# Patient Record
Sex: FEMALE | Race: BLACK OR AFRICAN AMERICAN | Employment: FULL TIME | URBAN - METROPOLITAN AREA
[De-identification: names, ages, dates, MRNs, and addresses within clinical notes are randomized per-mention and may not be internally consistent; named-entity substitution may affect disease eponyms.]

---

## 2018-04-19 ENCOUNTER — OFFICE VISIT (OUTPATIENT)
Dept: CARDIOLOGY CLINIC | Age: 48
End: 2018-04-19

## 2018-04-19 ENCOUNTER — TELEPHONE (OUTPATIENT)
Dept: CARDIOLOGY CLINIC | Age: 48
End: 2018-04-19

## 2018-04-19 VITALS
SYSTOLIC BLOOD PRESSURE: 110 MMHG | DIASTOLIC BLOOD PRESSURE: 80 MMHG | OXYGEN SATURATION: 99 % | HEIGHT: 63 IN | WEIGHT: 195 LBS | HEART RATE: 70 BPM | BODY MASS INDEX: 34.55 KG/M2 | RESPIRATION RATE: 16 BRPM

## 2018-04-19 DIAGNOSIS — R10.9 ABDOMINAL PAIN, UNSPECIFIED ABDOMINAL LOCATION: ICD-10-CM

## 2018-04-19 DIAGNOSIS — R00.2 HEART PALPITATIONS: Primary | ICD-10-CM

## 2018-04-19 DIAGNOSIS — Z95.828 S/P IVC FILTER: ICD-10-CM

## 2018-04-19 DIAGNOSIS — M54.9 BACK PAIN, UNSPECIFIED BACK LOCATION, UNSPECIFIED BACK PAIN LATERALITY, UNSPECIFIED CHRONICITY: ICD-10-CM

## 2018-04-19 DIAGNOSIS — Z86.711 HISTORY OF PULMONARY EMBOLISM: ICD-10-CM

## 2018-04-19 DIAGNOSIS — I82.409 DEEP VEIN THROMBOSIS (DVT) OF LOWER EXTREMITY, UNSPECIFIED CHRONICITY, UNSPECIFIED LATERALITY, UNSPECIFIED VEIN (HCC): ICD-10-CM

## 2018-04-19 RX ORDER — LANOLIN ALCOHOL/MO/W.PET/CERES
400 CREAM (GRAM) TOPICAL 2 TIMES DAILY
COMMUNITY
End: 2018-04-19 | Stop reason: SDUPTHER

## 2018-04-19 NOTE — TELEPHONE ENCOUNTER
Verified patient with two patient identifiers. Spoke with patient,she will have her lab work done when she comes for Echo and holter on April 30,2018.

## 2018-04-19 NOTE — MR AVS SNAPSHOT
[address] [facility] [address] [place] [address] 
[phone] Patient: Mckayla Ardon MRN: [id number] :1970 Visit Information Date & Time Provider Department Dept. Phone Encounter #  
 2018  2:40 PM Raine Calvin MD [facility] [phone] [id number] Upcoming Health Maintenance Date Due DTaP/Tdap/Td series (1 - Tdap) 10/10/1992 PAP AKA CERVICAL CYTOLOGY 10/10/1992 Influenza Age 5 to Adult 2017 Allergies as of 2018  Review Complete On: 2018 By: Wojciech Rodriguez Severity Noted Reaction Type Reactions Latex  2012    Rash Advil [Ibuprofen]  04/15/2016    Other (comments)  
 heartburn Ciprofloxacin  2011    Nausea and Vomiting Demerol [Meperidine]  2011    Anaphylaxis Tylenol-codeine #2  10/29/2015    Palpitations Current Immunizations  Never Reviewed No immunizations on file. Not reviewed this visit You Were Diagnosed With   
  
 Codes Comments Heart palpitations    -  Primary ICD-10-CM: R00.2 ICD-9-CM: 785.1 Vitals BP Pulse Resp Height(growth percentile) Weight(growth percentile) LMP  
 110/80 (BP 1 Location: Left arm, BP Patient Position: Sitting) 70 16 5' 3\" (1.6 m) 195 lb (88.5 kg) 2015 SpO2 BMI OB Status Smoking Status 99% 34.54 kg/m2 Hysterectomy Never Smoker BMI and BSA Data Body Mass Index Body Surface Area 34.54 kg/m 2 1.98 m 2 Preferred Pharmacy Pharmacy Name Phone [phone] [facility] [phone] Your Updated Medication List  
  
   
This list is accurate as of 18  3:26 PM.  Always use your most recent med list.  
  
  
  
  
 magnesium oxide 400 mg tablet Commonly known as:  MAG-OX Take 400 mg by mouth two (2) times a day. PROBIOTIC (S.BOULARDII) PO Take  by mouth. We Performed the Following AMB POC EKG ROUTINE W/ 12 LEADS, INTER & REP [43413 CPT(R)] Patient Instructions Refer to [employer] (Will call with date and time) Start magnesium 400 mg twice a day Echo,holter and will call results Introducing [facility]! Louis Ashraf introduces [employer] patient portal. Now you can access parts of your medical record, email your doctor's office, and request medication refills online. 1. In your internet browser, go to [email or web address] 2. Click on the First Time User? Click Here link in the Sign In box. You will see the New Member Sign Up page. 3. Enter your [employer] Access Code exactly as it appears below. You will not need to use this code after youve completed the sign-up process. If you do not sign up before the expiration date, you must request a new code. · [employer] Access Code: [email or web address] Expires: 7/8/2018  5:37 PM 
 
4. Enter the last four digits of your Social Security Number (xxxx) and Date of Birth (mm/dd/yyyy) as indicated and click Submit. You will be taken to the next sign-up page. 5. Create a [employer] ID. This will be your [employer] login ID and cannot be changed, so think of one that is secure and easy to remember. 6. Create a [employer] password. You can change your password at any time. 7. Enter your Password Reset Question and Answer. This can be used at a later time if you forget your password. 8. Enter your e-mail address. You will receive e-mail notification when new information is available in [address] 9. Click Sign Up. You can now view and download portions of your medical record. 10. Click the Download Summary menu link to download a portable copy of your medical information. If you have questions, please visit the Frequently Asked Questions section of the [employer] website.  Remember, [employer] is NOT to be used for urgent needs. For medical emergencies, dial 911. Now available from your iPhone and Android! Please provide this summary of care documentation to your next provider. Your primary care clinician is listed as Dar Morris. If you have any questions after today's visit, please call [phone].

## 2018-04-19 NOTE — PATIENT INSTRUCTIONS
Refer to [facility] (Will call with date and time)    Start magnesium 400 mg twice a day    Echo,holter and will call results

## 2018-04-19 NOTE — TELEPHONE ENCOUNTER
I called and left a messaged for her to call back. Per  do BMP and magnesium level. She can get this lab drawn when she comes back for Echo and holter.

## 2018-04-19 NOTE — PROGRESS NOTES
Amy Hallman     1970       Zainab Thornton MD, [facility]  Date of Visit-2018   PCP is Mohan Hernandez MD   [facility] [place]  [facility]  HPI:  Amy Hallman is a 56 y.o. female   referral from Pt. First for palpitations. I had last seen 2013 for elevated BP and heart palpitations        The pt states that lately she has been experiencing some fluttering in her chest. This makes her feel short of breath. She states that this fluttering occurs suddenly and lasts for a few seconds. The pt describes this as feeling like her heart has dropped. She states that she is feeling this fluttering sensation 3 times a day. The pt states that she hasn't been exercising that much recently. She denies any lifestyle changes or excessive amounts of caffeine that could be attributing to these palpitations. The pt denies smoking or being stressed at this time. She notes that she had blood work done at [facility]. The pt states that she was feeling a sharp pain on her right side that radiates to her abdomen. This worsens with food and feels like the pain she had prior to having her gallbladder removed. The pt still has jono filters in from her  in 80. She has never discussed with anyone removed her filters. Famhx: father with bypass at 61    Denies chest pain, edema, syncope or shortness of breath at rest, has no tachycardia or sense of arrhythmia. EKG: NSR WNL normal axis and intervals     Assessment/Plan:     1. Palpitations that sound like ectopics with a sudden feeling of a dropped beat. Will get a 24 hour holter since they occur fairly frequently    2. Multiple previous IVC filters, she is concerned about clotting and other late complications, we will have her see Dr. Brooke Johnson. 3. Get echo given hx of extensive DVT to look at right side of the heart and RV size given palps    4.  Colicky back and abdominal pain feels like her previous pain but has had a cholecystectomy, she will talk to GI about next steps *.    5. Get labs, electrolytes from pt first and start on magnesium oxide 400 mg   Future Appointments  Date Time Provider Fortunato Patel   4/30/2018 8:00 AM ECHOTWO, [address]   4/30/2018 9:00 AM HOLTER, [address]      Patient Instructions   Refer to [employer] (Will call with date and time)    Start magnesium 400 mg twice a day    Echo,holter and will call results     Key CAD CHF Meds     Patient is on no cardiovascular meds. Impression:   1. Heart palpitations    2. S/P IVC filter    3. Deep vein thrombosis (DVT) of lower extremity, unspecified chronicity, unspecified laterality, unspecified vein (HCC)    4. Abdominal pain, unspecified abdominal location    5. Back pain, unspecified back location, unspecified back pain laterality, unspecified chronicity    6. History of pulmonary embolism       Cardiac History:   NUKE 10-9-13= 9 minutes within normal limits ef 68%    Echocardiogram done May 30, 2013 showed EF of 55-60%, left atrium mildly dilated with mild MR and mild TR. A Holter showed sinus rhythm with sinus tachycardia, two PACs and no diary. There were overall 88,638 beats with no PVCs and two isolated PACs at 10:00 PM noted. Social history: Nonsmoker. Drinks 1-2 cups a year. Works as an  and drinks 1-2 cups of caffeine yearly. . Family history: Mother with hypertension, CAD in her 62s. Review of Systems   Constitutional: Negative for diaphoresis, fever and malaise/fatigue. HENT: Positive for tinnitus. Negative for ear pain, hearing loss and nosebleeds. Eyes: Positive for blurred vision. Negative for double vision and pain. Respiratory: Negative for cough, hemoptysis, sputum production, shortness of breath, wheezing and stridor. Cardiovascular: Positive for palpitations.  Negative for chest pain, orthopnea, claudication and leg swelling. Gastrointestinal: Positive for abdominal pain and diarrhea. Negative for blood in stool, constipation, heartburn, melena, nausea and vomiting. Genitourinary: Negative for dysuria, frequency and urgency. Musculoskeletal: Positive for back pain and joint pain. Negative for falls, myalgias and neck pain. Skin: Positive for itching. Negative for rash. Neurological: Positive for dizziness, sensory change and headaches. Negative for seizures, loss of consciousness and weakness. Endo/Heme/Allergies: Does not bruise/bleed easily. Psychiatric/Behavioral: Negative for depression, hallucinations and memory loss. The patient has insomnia. The patient is not nervous/anxious. see supplement sheet, initialed and to be scanned by staff  Past Medical History:   Diagnosis Date    Chest pain     normal GAURI 2012 9 minutes    Cholecystitis     gallstones, choly June 2011 [id number]    DVT, lower extremity [facility])     post pregnancy June 1999    Elevated BP     GERD (gastroesophageal reflux disease)     Heart palpitations     Pulmonary embolism (HCC)     several Stefania's       Social Hx= reports that she has never smoked. She has never used smokeless tobacco. She reports that she does not drink alcohol or use illicit drugs. Exam and Labs:  /80 (BP 1 Location: Left arm, BP Patient Position: Sitting)  Pulse 70  Resp 16  Ht 5' 3\" (1.6 m)  Wt 195 lb (88.5 kg)  LMP 01/22/2015  SpO2 99%  BMI 34.54 kg/w4Vpmvpgintiolpb:  NAD, comfortable  Head: NC,AT. Eyes: No scleral icterus. Neck:  Neck supple. No JVD present. Throat: moist mucous membranes. Chest: Effort normal & normal respiratory excursion . Neurological: alert, conversant and oriented . Skin: Skin is not cold. No obvious systemic rash noted. Not diaphoretic. No erythema. Psychiatric:  Grossly normal mood and affect. Behavior appears normal. Extremities:  no clubbing or cyanosis.  Abdomen: non distended    Lungs:breath sounds normal. No stridor. distress, wheezes or  Rales. Heart: normal rate, regular rhythm, normal S1, S2, no murmurs, rubs, clicks or gallops , PMI non displaced. Edema: Edema is . Wt Readings from Last 3 Encounters:   04/19/18 195 lb (88.5 kg)   12/12/16 194 lb 7.1 oz (88.2 kg)   12/11/16 194 lb 10.7 oz (88.3 kg)      BP Readings from Last 3 Encounters:   04/19/18 110/80   12/12/16 (!) 113/98   12/11/16 131/72      Current Outpatient Prescriptions   Medication Sig    SACCHAROMYCES BOULARDII (PROBIOTIC, S.BOULARDII, PO) Take  by mouth. No current facility-administered medications for this visit. Impression see above.       Written by Monisha Lei, as dictated by Belia Obrien MD.

## 2018-04-20 ENCOUNTER — TELEPHONE (OUTPATIENT)
Dept: CARDIOLOGY CLINIC | Age: 48
End: 2018-04-20

## 2018-04-20 RX ORDER — LANOLIN ALCOHOL/MO/W.PET/CERES
400 CREAM (GRAM) TOPICAL 2 TIMES DAILY
Qty: 60 TAB | Refills: 3 | Status: SHIPPED | OUTPATIENT
Start: 2018-04-20

## 2018-04-20 NOTE — TELEPHONE ENCOUNTER
Verified patient with two patient identifiers.   Spoke with Kenyon Peters from [facility] and gave me an appointment on May 11,2018 at [facility] office @ 10 AM.

## 2018-04-30 ENCOUNTER — CLINICAL SUPPORT (OUTPATIENT)
Dept: CARDIOLOGY CLINIC | Age: 48
End: 2018-04-30

## 2018-04-30 DIAGNOSIS — R00.2 PALPITATIONS: Primary | ICD-10-CM

## 2018-04-30 DIAGNOSIS — R06.02 SOB (SHORTNESS OF BREATH): ICD-10-CM

## 2018-04-30 DIAGNOSIS — I51.7 LAE (LEFT ATRIAL ENLARGEMENT): ICD-10-CM

## 2018-04-30 PROBLEM — Z86.711 HISTORY OF PULMONARY EMBOLISM: Status: ACTIVE | Noted: 2018-04-30

## 2018-05-01 LAB
BUN SERPL-MCNC: 14 MG/DL (ref 6–24)
BUN/CREAT SERPL: 22 (ref 9–23)
CALCIUM SERPL-MCNC: 9.1 MG/DL (ref 8.7–10.2)
CHLORIDE SERPL-SCNC: 100 MMOL/L (ref 96–106)
CO2 SERPL-SCNC: 25 MMOL/L (ref 18–29)
CREAT SERPL-MCNC: 0.64 MG/DL (ref 0.57–1)
GFR SERPLBLD CREATININE-BSD FMLA CKD-EPI: 107 ML/MIN/1.73
GFR SERPLBLD CREATININE-BSD FMLA CKD-EPI: 124 ML/MIN/1.73
GLUCOSE SERPL-MCNC: 94 MG/DL (ref 65–99)
MAGNESIUM SERPL-MCNC: 2.2 MG/DL (ref 1.6–2.3)
POTASSIUM SERPL-SCNC: 4.6 MMOL/L (ref 3.5–5.2)
SODIUM SERPL-SCNC: 139 MMOL/L (ref 134–144)

## 2018-05-09 ENCOUNTER — DOCUMENTATION ONLY (OUTPATIENT)
Dept: CARDIOLOGY CLINIC | Age: 48
End: 2018-05-09

## 2018-05-09 NOTE — LETTER
5/10/2018 6:45 PM 
 
Ms. Amy Hallman [address] [place] [address]. [id number] Dear Ms Pérez [place] Your echocardiogram (ultrasound) test of your heart is unremarkable. Your heart function is normal and your heart valves have no significant abnormalities. Your Holter monitor showed no significant arrhythmia with just 2 early beats in the full time of the monitor which is normal 
 
Fortunately your symptoms do not seem to be cardiac. If they get worse or if during the time your wore the monitor you did not feel your typical episodes , we could do a 30 day loop monitor and please let us know.  
 
Sincerely, 
 
 
Elen Singh MD

## 2018-05-10 NOTE — PROGRESS NOTES
Let her know echo and monitor are normal  No significant arrhythmia ,very rare PVC which is normal  Heart fx fine with no stretch of chambers or valve problem  No further testing needed

## 2018-05-16 ENCOUNTER — TELEPHONE (OUTPATIENT)
Dept: CARDIOLOGY CLINIC | Age: 48
End: 2018-05-16

## 2018-05-18 NOTE — TELEPHONE ENCOUNTER
Verified patient with two types of identifiers. Notified patient of echo and holter results. Patient states that she did receive the letter in the mail yesterday with the results. Patient states that she will call back if she feels she needs to proceed with the 30 day loop monitor. Patient verbalized understanding and will call with any other questions.

## 2018-07-02 ENCOUNTER — TELEPHONE (OUTPATIENT)
Dept: CARDIOLOGY CLINIC | Age: 48
End: 2018-07-02

## 2018-07-02 NOTE — TELEPHONE ENCOUNTER
Verified patient with two types of identifiers. Pt is to have some procedures scheduled. Needs clearance. Office contact provided by patient. Will call for more information. [place] teeth extraction. Dr. Messina [facility] office. Contacted office. Patient is scheduled 7/12/18 for consult. [facility] contact information provided. Will send request for clearance after consult. Called Dr. Kristi Mayorga office with [facility]. Patient is to have possible hysteroscopy, DNC, with possible LEEP with anesthesia and needs cardiac clearance.   Fax: [phone]    Will ask MD

## 2018-07-03 NOTE — TELEPHONE ENCOUNTER
Low risk procedures  I have no objection to the planned  surgery. Patient seems to be at a low risk for lucila-operative severe adverse cardiac events.

## 2018-07-03 NOTE — TELEPHONE ENCOUNTER
Verified patient with two types of identifiers. Informed pt of MD recommendation below. Verbalizes understanding. And will call with any questions or concerns.

## 2018-07-03 NOTE — TELEPHONE ENCOUNTER
Faxed Cardiac risk assessment to Dr. Erasmo Mireles office at fax number [phone]. Fax confirmation received.

## 2018-07-13 ENCOUNTER — TELEPHONE (OUTPATIENT)
Dept: CARDIOLOGY CLINIC | Age: 48
End: 2018-07-13

## 2018-07-13 RX ORDER — ROSUVASTATIN CALCIUM 5 MG/1
TABLET, COATED ORAL
COMMUNITY

## 2018-07-13 NOTE — TELEPHONE ENCOUNTER
Verified patient with two types of identifiers. Patient states that her PCP started her on Crestor 5 mg daily based on labs a year ago. She states her PCP ordered new labs for her but wanted to notify MD. Will update her medication list. Patient verbalized understanding and will call with any other questions.

## 2018-07-13 NOTE — TELEPHONE ENCOUNTER
Pt called to inform Dr. Morales Johnson that she was recently prescribed a cholesterol medication by her PCP. Medication name: Crestor 5mg tab  Pt can be reached at #[phone].   Thanks

## 2018-08-31 ENCOUNTER — TELEPHONE (OUTPATIENT)
Dept: CARDIOLOGY CLINIC | Age: 48
End: 2018-08-31

## 2018-08-31 NOTE — TELEPHONE ENCOUNTER
Verified patient with two types of identifiers. Notified patient that we have not received EKG yet but that we will be looking out for it. Gave patient fax number. Patient verbalized understanding and will call with any other questions.

## 2018-08-31 NOTE — TELEPHONE ENCOUNTER
Pt is calling in regards to an EKG results that Dr. [address] is suppose to review before patient surgery on Wednesday September 5, 2018. She had EKG done at [facility] for pre-admission for the surgery. [facility] stated they would send EKG over for review.    Phone# [phone]

## 2022-03-18 PROBLEM — Z86.711 HISTORY OF PULMONARY EMBOLISM: Status: ACTIVE | Noted: 2018-04-30

## 2024-02-10 ENCOUNTER — HOSPITAL ENCOUNTER (OUTPATIENT)
Facility: HOSPITAL | Age: 54
Discharge: HOME | End: 2024-02-13
Attending: ORTHOPAEDIC SURGERY

## 2024-02-10 DIAGNOSIS — M25.551 RIGHT HIP PAIN: ICD-10-CM

## 2024-03-21 ENCOUNTER — HOSPITAL ENCOUNTER (OUTPATIENT)
Facility: HOSPITAL | Age: 54
Discharge: HOME | End: 2024-03-21
Attending: ORTHOPAEDIC SURGERY
Payer: OTHER GOVERNMENT

## 2024-03-21 DIAGNOSIS — M25.551 RIGHT HIP PAIN: ICD-10-CM

## 2024-03-21 PROCEDURE — 73700 CT LOWER EXTREMITY W/O DYE: CPT

## 2024-05-09 ENCOUNTER — OFFICE VISIT (OUTPATIENT)
Age: 54
End: 2024-05-09
Payer: OTHER GOVERNMENT

## 2024-05-09 VITALS
SYSTOLIC BLOOD PRESSURE: 128 MMHG | HEART RATE: 70 BPM | DIASTOLIC BLOOD PRESSURE: 80 MMHG | WEIGHT: 189 LBS | OXYGEN SATURATION: 99 % | HEIGHT: 63 IN | BODY MASS INDEX: 33.49 KG/M2

## 2024-05-09 DIAGNOSIS — R07.2 PRECORDIAL CHEST PAIN: Primary | ICD-10-CM

## 2024-05-09 DIAGNOSIS — I10 HYPERTENSION, ESSENTIAL: ICD-10-CM

## 2024-05-09 DIAGNOSIS — E78.00 PURE HYPERCHOLESTEROLEMIA: ICD-10-CM

## 2024-05-09 DIAGNOSIS — E11.9 TYPE 2 DIABETES MELLITUS WITHOUT COMPLICATION, UNSPECIFIED WHETHER LONG TERM INSULIN USE (HCC): ICD-10-CM

## 2024-05-09 DIAGNOSIS — Z09 HOSPITAL DISCHARGE FOLLOW-UP: ICD-10-CM

## 2024-05-09 PROCEDURE — 3074F SYST BP LT 130 MM HG: CPT | Performed by: SPECIALIST

## 2024-05-09 PROCEDURE — 1111F DSCHRG MED/CURRENT MED MERGE: CPT | Performed by: SPECIALIST

## 2024-05-09 PROCEDURE — 99244 OFF/OP CNSLTJ NEW/EST MOD 40: CPT | Performed by: SPECIALIST

## 2024-05-09 PROCEDURE — 3079F DIAST BP 80-89 MM HG: CPT | Performed by: SPECIALIST

## 2024-05-09 ASSESSMENT — PATIENT HEALTH QUESTIONNAIRE - PHQ9
SUM OF ALL RESPONSES TO PHQ QUESTIONS 1-9: 0
1. LITTLE INTEREST OR PLEASURE IN DOING THINGS: NOT AT ALL
SUM OF ALL RESPONSES TO PHQ QUESTIONS 1-9: 0

## 2024-05-09 NOTE — PATIENT INSTRUCTIONS
Keep a home blood pressure diary. Check your blood pressure three times a week at different times. Be sure to make sure you are sitting still for at least five minutes with both feet flat on the floor and arm heart level. Send a [employer] message when you have 10-15 numbers. We will schedule you for an in office BP check.    You have been scheduled for a stress echocardiogram. Please arrive 15 minutes prior to your appointment. Wear comfortable clothes and shoes. Please do not eat or drink anything other than water two hours prior to test. We will send results on [employer].

## 2024-05-16 ENCOUNTER — OFFICE VISIT (OUTPATIENT)
Age: 54
End: 2024-05-16

## 2024-05-16 VITALS
DIASTOLIC BLOOD PRESSURE: 80 MMHG | HEART RATE: 64 BPM | RESPIRATION RATE: 16 BRPM | HEIGHT: 64 IN | OXYGEN SATURATION: 98 % | BODY MASS INDEX: 32.27 KG/M2 | WEIGHT: 189 LBS | SYSTOLIC BLOOD PRESSURE: 130 MMHG

## 2024-05-16 DIAGNOSIS — I10 HYPERTENSION, ESSENTIAL: Primary | ICD-10-CM

## 2024-05-16 ASSESSMENT — PATIENT HEALTH QUESTIONNAIRE - PHQ9
SUM OF ALL RESPONSES TO PHQ9 QUESTIONS 1 & 2: 0
SUM OF ALL RESPONSES TO PHQ QUESTIONS 1-9: 0
1. LITTLE INTEREST OR PLEASURE IN DOING THINGS: NOT AT ALL
SUM OF ALL RESPONSES TO PHQ QUESTIONS 1-9: 0
2. FEELING DOWN, DEPRESSED OR HOPELESS: NOT AT ALL

## 2024-05-23 ENCOUNTER — OFFICE VISIT (OUTPATIENT)
Age: 54
End: 2024-05-23

## 2024-05-23 VITALS — DIASTOLIC BLOOD PRESSURE: 78 MMHG | SYSTOLIC BLOOD PRESSURE: 146 MMHG

## 2024-05-23 DIAGNOSIS — I10 HYPERTENSION, ESSENTIAL: Primary | ICD-10-CM

## 2024-05-31 ENCOUNTER — TELEPHONE (OUTPATIENT)
Age: 54
End: 2024-05-31

## 2024-05-31 RX ORDER — AMLODIPINE BESYLATE 5 MG/1
5 TABLET ORAL DAILY
Qty: 90 TABLET | Refills: 3 | Status: SHIPPED | OUTPATIENT
Start: 2024-05-31

## 2024-05-31 NOTE — TELEPHONE ENCOUNTER
See MCM.    Rx sent per VO by MD.     ----- Message from Johana Ervin MD sent at 5/31/2024  9:31 AM EDT -----  Kaushik, let her know that her stress echo is normal.  Her blood pressures are still a bit on the higher side and I would recommend that she start medication.  She can start amlodipine 5 mg daily.  Let her know there is a small chance of swelling in the feet.  She should follow-up in 3 months with her primary care physician for the blood pressure.  Since her stress echo was normal her follow-up can be with PCP.  Tell her thanks for letting us see her.  No future appointments.

## 2025-05-08 ENCOUNTER — OFFICE VISIT (OUTPATIENT)
Age: 55
End: 2025-05-08
Payer: OTHER GOVERNMENT

## 2025-05-08 VITALS
BODY MASS INDEX: 33.66 KG/M2 | DIASTOLIC BLOOD PRESSURE: 80 MMHG | WEIGHT: 190 LBS | OXYGEN SATURATION: 99 % | SYSTOLIC BLOOD PRESSURE: 120 MMHG | HEIGHT: 63 IN | HEART RATE: 66 BPM

## 2025-05-08 DIAGNOSIS — E11.9 TYPE 2 DIABETES MELLITUS WITHOUT COMPLICATION, UNSPECIFIED WHETHER LONG TERM INSULIN USE (HCC): ICD-10-CM

## 2025-05-08 DIAGNOSIS — R94.31 ABNORMAL EKG: Primary | ICD-10-CM

## 2025-05-08 DIAGNOSIS — I10 HYPERTENSION, ESSENTIAL: ICD-10-CM

## 2025-05-08 DIAGNOSIS — E78.00 PURE HYPERCHOLESTEROLEMIA: ICD-10-CM

## 2025-05-08 PROCEDURE — 3079F DIAST BP 80-89 MM HG: CPT | Performed by: SPECIALIST

## 2025-05-08 PROCEDURE — 93010 ELECTROCARDIOGRAM REPORT: CPT | Performed by: SPECIALIST

## 2025-05-08 PROCEDURE — 3074F SYST BP LT 130 MM HG: CPT | Performed by: SPECIALIST

## 2025-05-08 PROCEDURE — 93005 ELECTROCARDIOGRAM TRACING: CPT | Performed by: SPECIALIST

## 2025-05-08 PROCEDURE — 99214 OFFICE O/P EST MOD 30 MIN: CPT | Performed by: SPECIALIST

## 2025-05-08 RX ORDER — AMLODIPINE BESYLATE 5 MG/1
5 TABLET ORAL DAILY
Qty: 90 TABLET | Refills: 3 | Status: SHIPPED | OUTPATIENT
Start: 2025-05-08

## 2025-05-08 ASSESSMENT — PATIENT HEALTH QUESTIONNAIRE - PHQ9
SUM OF ALL RESPONSES TO PHQ QUESTIONS 1-9: 0
SUM OF ALL RESPONSES TO PHQ QUESTIONS 1-9: 0
1. LITTLE INTEREST OR PLEASURE IN DOING THINGS: NOT AT ALL
SUM OF ALL RESPONSES TO PHQ QUESTIONS 1-9: 0
SUM OF ALL RESPONSES TO PHQ QUESTIONS 1-9: 0
2. FEELING DOWN, DEPRESSED OR HOPELESS: NOT AT ALL

## 2025-05-08 NOTE — PATIENT INSTRUCTIONS
Your heart is stable. Please see us back as needed if you develop any chest pain, tightness, lower extremity swelling or other new heart symptoms. We are glad to see you back anytime you or your PCP, Josephine Tian MD , feel it is needed. Thank you Johana Ervin MD

## 2025-05-08 NOTE — PROGRESS NOTES
Marta Pelaez     1970       Johana Ervin MD, [facility]  Date of Visit-5/8/2025   PCP is Josephine Tian MD   [facility] [place]  [facility]  HPI:  Marta Pelaez is a 54 y.o. female   Seen previously for HTN management  Recent Urgent care visit for back and shoulder pain, sent to ER at [facility] to review EKGs  ER recs 4/9/2025  from [facility] in [facility] indicate non specific STTs noted with no change by ER physician-normal troponin ( was 0) CBC and CMP  Since then some more shoulder pain right and left , seems MSK    No chest pain, sob, edema , palps or tachy  She has some slight trace swelling at ankles at times, she knows its from the amlodipine (Norvasc)    Previous evaluation for CP and had normal stress echo 5/30/2024  Her risk factors include a strong family history of coronary disease dyslipidemia mildly elevated BMI of 33  Seen 2024 with stress echo and we started amlodipine (Norvasc)    Seen 2013 for elevated BP and palps  2018 noting multiple previous IVC filters, DVTs  See Ortho for right hip pain  Gets labs and regular followup with Josephine Tian MD PCP      EKG today NSR WNL borderline for LAE -no acute findings or changes  Currently in past few weeks, asymptomatic for CV symptoms- Denies chest pain, edema, syncope or shortness of breath at rest , Has no tachycardia , palpitations or sense of arrythmia    Assessment/Plan:     Patient Instructions   Your heart is stable. Please see us back as needed if you develop any chest pain, tightness, lower extremity swelling or other new heart symptoms. We are glad to see you back anytime you or your PCP, Josephine Tian MD , feel it is needed. Thank you Johana Ervin MD      1. Possible EKG changes  Reading ER recs indicate minor STT findings and no change other than rate, here today EKG is unchanged from previous in our records  Not having any chest pain and stress echo normal in  May 2024  No

## 2025-05-08 NOTE — TELEPHONE ENCOUNTER
Per VO by MD.    Future Appointments   Date Time Provider Department Center   5/8/2025  3:40 PM Johana Ervin MD [facility]